# Patient Record
Sex: FEMALE | Race: BLACK OR AFRICAN AMERICAN | Employment: FULL TIME | URBAN - METROPOLITAN AREA
[De-identification: names, ages, dates, MRNs, and addresses within clinical notes are randomized per-mention and may not be internally consistent; named-entity substitution may affect disease eponyms.]

---

## 2020-05-24 ENCOUNTER — HOSPITAL ENCOUNTER (EMERGENCY)
Age: 30
Discharge: HOME | End: 2020-05-24
Attending: EMERGENCY MEDICINE
Payer: OTHER GOVERNMENT

## 2020-05-24 VITALS
WEIGHT: 150 LBS | BODY MASS INDEX: 29.45 KG/M2 | SYSTOLIC BLOOD PRESSURE: 108 MMHG | RESPIRATION RATE: 18 BRPM | HEART RATE: 98 BPM | OXYGEN SATURATION: 100 % | TEMPERATURE: 100 F | DIASTOLIC BLOOD PRESSURE: 76 MMHG | HEIGHT: 60 IN

## 2020-05-24 DIAGNOSIS — J02.0 ACUTE STREPTOCOCCAL PHARYNGITIS: Primary | ICD-10-CM

## 2020-05-24 LAB — DEPRECATED S PYO AG THROAT QL EIA: POSITIVE

## 2020-05-24 PROCEDURE — 74011250637 HC RX REV CODE- 250/637: Performed by: EMERGENCY MEDICINE

## 2020-05-24 PROCEDURE — 87880 STREP A ASSAY W/OPTIC: CPT

## 2020-05-24 PROCEDURE — 99283 EMERGENCY DEPT VISIT LOW MDM: CPT

## 2020-05-24 RX ORDER — ACETAMINOPHEN 500 MG
1000 TABLET ORAL
Status: COMPLETED | OUTPATIENT
Start: 2020-05-24 | End: 2020-05-24

## 2020-05-24 RX ORDER — AMOXICILLIN 500 MG/1
500 TABLET, FILM COATED ORAL 3 TIMES DAILY
Qty: 30 TAB | Refills: 0 | Status: SHIPPED | OUTPATIENT
Start: 2020-05-24 | End: 2020-05-25

## 2020-05-24 RX ADMIN — ACETAMINOPHEN 1000 MG: 500 TABLET, FILM COATED ORAL at 12:03

## 2020-05-24 NOTE — LETTER
NOTIFICATION OF RETURN TO WORK 
 
5/24/2020 12:35 PM 
 
Ms. Karlo Bernard [facility] [id number] Andre[place] Wu To Whom It May Concern: 
 
Karlo Bernard was under the care of [facility] EMERGENCY DEPT. She will be able to return to work on Wednesday, 5/27/20. If there are questions or concerns please have the patient contact our office. Sincerely, Sanjay Galarza PA-C

## 2020-05-24 NOTE — ED PROVIDER NOTES
EMERGENCY DEPARTMENT HISTORY AND PHYSICAL EXAM    12:33 PM      Date: 5/24/2020  Patient Name: Marlon Fleming    History of Presenting Illness     Chief Complaint   Patient presents with    Headache    Sore Throat     History Provided By: Patient    Chief Complaint: headache, sore throat, fever  Duration: 2 Days  Timing:  Acute  Location:   Quality: Aching  Severity: 5 out of 10  Modifying Factors: none  Associated Symptoms: denies any other associated signs or symptoms      Additional History (Context):Leo Steve is a 34 y.o. female who presents to the emergency department for evaluation of sore throat, headache, and fever for the past few days. Patient states she also has a very bad breath. No known ill contacts. No associated nasal congestion or cough. No possibility of pregnancy, urinary symptoms, nausea, vomiting, or any other complaints. PCP:  Estefanía, MD Nathanael            Past History     Past Medical History:  History reviewed. No pertinent past medical history. Past Surgical History:  History reviewed. No pertinent surgical history. Family History:  History reviewed. No pertinent family history. Social History:  Social History     Tobacco Use    Smoking status: Not on file   Substance Use Topics    Alcohol use: Not on file    Drug use: Not on file       Allergies:  No Known Allergies      Review of Systems       Review of Systems   Constitutional: Positive for fever. Negative for chills. HENT: Positive for sore throat. Negative for congestion and rhinorrhea. Respiratory: Negative for cough and shortness of breath. Cardiovascular: Negative for chest pain. Gastrointestinal: Negative for abdominal pain, blood in stool, constipation, diarrhea, nausea and vomiting. Genitourinary: Negative for dysuria, frequency and hematuria. Musculoskeletal: Negative for back pain and myalgias. Skin: Negative for rash and wound. Neurological: Positive for headaches.  Negative for dizziness. All other systems reviewed and are negative. Physical Exam     Visit Vitals  /76   Pulse 98   Temp 100 °F (37.8 °C)   Resp 18   Ht 5' (1.524 m)   Wt 68 kg (150 lb)   SpO2 100%   BMI 29.29 kg/m²       Physical Exam  Vitals signs and nursing note reviewed. Constitutional:       General: She is not in acute distress. Appearance: She is well-developed. She is not diaphoretic. HENT:      Head: Normocephalic and atraumatic. Mouth/Throat:      Mouth: Mucous membranes are moist.      Tonsils: Tonsillar exudate present. No tonsillar abscesses. 2+ on the right. 2+ on the left. Eyes:      Conjunctiva/sclera: Conjunctivae normal.   Neck:      Musculoskeletal: Normal range of motion and neck supple. Cardiovascular:      Rate and Rhythm: Normal rate and regular rhythm. Heart sounds: Normal heart sounds. Pulmonary:      Effort: Pulmonary effort is normal. No respiratory distress. Breath sounds: Normal breath sounds. Chest:      Chest wall: No tenderness. Abdominal:      General: Bowel sounds are normal. There is no distension. Palpations: Abdomen is soft. Tenderness: There is no abdominal tenderness. There is no guarding or rebound. Musculoskeletal:         General: No deformity. Skin:     General: Skin is warm and dry. Neurological:      Mental Status: She is alert and oriented to person, place, and time. Deep Tendon Reflexes: Reflexes are normal and symmetric. Diagnostic Study Results     Labs -  Recent Results (from the past 12 hour(s))   STREP AG SCREEN, GROUP A    Collection Time: 05/24/20 12:05 PM   Result Value Ref Range    Group A Strep Ag ID Positive         Radiologic Studies -   No results found. Medical Decision Making   I am the first provider for this patient. I reviewed the vital signs, available nursing notes, past medical history, past surgical history, family history and social history.     Vital Signs-Reviewed the patient's vital signs. Pulse Oximetry Analysis -  100% on room air (Interpretation)    Records Reviewed: Nursing Notes and Old Medical Records (Time of Review: 12:33 PM)    ED Course: Progress Notes, Reevaluation, and Consults:    Provider Notes (Medical Decision Making):   differential diagnosis: Strep, allergic rhinitis, viral URI    Plan: Patient presents ambulatory with fever and otherwise normal vitals. Exam reveals bilateral tonsillar swelling with exudates. Patient handling oral secretions without difficulty. Negative pregnancy test.  Positive rapid strep. Will discharge home with amoxicillin. Patient advised to follow-up with PCP. At this time, patient is stable and appropriate for discharge home. Patient demonstrates understanding of current diagnoses and is in agreement with the treatment plan. They are advised that while the likelihood of serious underlying condition is low at this point given the evaluation performed today, we cannot fully rule it out. They are advised to immediately return with any new symptoms or worsening of current condition. All questions have been answered. Patient is given educational material regarding their diagnoses, including danger symptoms and when to return to the ED. Diagnosis     Clinical Impression:   1. Acute streptococcal pharyngitis        Disposition: DC Home    Follow-up Information     Follow up With Specialties Details Why Contact Nuvia Leiva  Call in 2 days  [address]    [facility] EMERGENCY DEPT Emergency Medicine Go to As needed, If symptoms worsen [address] [facility] [address].  [phone]           Discharge Medication List as of 5/24/2020 12:35 PM      START taking these medications    Details   amoxicillin 500 mg tab Take 500 mg by mouth three (3) times daily for 10 days. , Print, Disp-30 Tab, R-0           _______________________________    This note was dictated utilizing voice recognition software which may lead to typographical errors. I apologize in advance if the situation occurs. If questions arise please do not hesitate to contact me or call our department.   Savannah Saez PA-C

## 2020-05-24 NOTE — DISCHARGE INSTRUCTIONS
Patient Education     Please return immediately to the Emergency Room for re-evaluation if you are not improving, develop any new symptoms, or develop worsening of current symptoms! If you have been prescribed a medication and are unable to take this medication for any reason, please return to the Emergency Department for further evaluation! If you have been referred for follow-up to a specialist, but are unable to follow-up and your symptoms are either not improving or are worsening, please return to the Emergency Department for further evaluation! Sore Throat: Care Instructions  Your Care Instructions    Infection by bacteria or a virus causes most sore throats. Cigarette smoke, dry air, air pollution, allergies, and yelling can also cause a sore throat. Sore throats can be painful and annoying. Fortunately, most sore throats go away on their own. If you have a bacterial infection, your doctor may prescribe antibiotics. Follow-up care is a key part of your treatment and safety. Be sure to make and go to all appointments, and call your doctor if you are having problems. It's also a good idea to know your test results and keep a list of the medicines you take. How can you care for yourself at home? · If your doctor prescribed antibiotics, take them as directed. Do not stop taking them just because you feel better. You need to take the full course of antibiotics. · Gargle with warm salt water once an hour to help reduce swelling and relieve discomfort. Use 1 teaspoon of salt mixed in 1 cup of warm water. · Take an over-the-counter pain medicine, such as acetaminophen (Tylenol), ibuprofen (Advil, Motrin), or naproxen (Aleve). Read and follow all instructions on the label. · Be careful when taking over-the-counter cold or flu medicines and Tylenol at the same time. Many of these medicines have acetaminophen, which is Tylenol.  Read the labels to make sure that you are not taking more than the recommended dose. Too much acetaminophen (Tylenol) can be harmful. · Drink plenty of fluids. Fluids may help soothe an irritated throat. Hot fluids, such as tea or soup, may help decrease throat pain. · Use over-the-counter throat lozenges to soothe pain. Regular cough drops or hard candy may also help. These should not be given to young children because of the risk of choking. · Do not smoke or allow others to smoke around you. If you need help quitting, talk to your doctor about stop-smoking programs and medicines. These can increase your chances of quitting for good. · Use a vaporizer or humidifier to add moisture to your bedroom. Follow the directions for cleaning the machine. When should you call for help? Call your doctor now or seek immediate medical care if:    · You have new or worse trouble swallowing.     · Your sore throat gets much worse on one side.    Watch closely for changes in your health, and be sure to contact your doctor if you do not get better as expected. Where can you learn more? Go to http://rafia-grant.info/  Enter U420 in the search box to learn more about \"Sore Throat: Care Instructions. \"  Current as of: July 28, 2019Content Version: 12.4  © 6592-0113 [employer]. Care instructions adapted under license by [employer] (which disclaims liability or warranty for this information). If you have questions about a medical condition or this instruction, always ask your healthcare professional. [place] [address] any warranty or liability for your use of this information.

## 2020-05-24 NOTE — ED TRIAGE NOTES
Pt states she has been having fever on and off with associated sore throat since yesterday and states \"my breath smells horrible. Susy tried everything and it still smells like I ate a baby diaper. \"

## 2020-05-25 RX ORDER — AMOXICILLIN 500 MG/1
500 TABLET, FILM COATED ORAL 3 TIMES DAILY
Qty: 30 TAB | Refills: 0 | Status: SHIPPED | OUTPATIENT
Start: 2020-05-25 | End: 2020-06-04

## 2020-05-27 ENCOUNTER — PATIENT OUTREACH (OUTPATIENT)
Dept: CASE MANAGEMENT | Age: 30
End: 2020-05-27

## 2020-05-27 NOTE — PROGRESS NOTES
Patient contacted regarding recent visit for viral symptoms. This Keri Thomasard contacted the patient by telephone to perform post discharge call. Verified name and  with patient as identifiers. Provided introduction to self, and reason for call due to viral symptoms of infection and/or exposure to COVID-19. Patient presented to emergency department/flu clinic with complaints of viral symptoms/exposure to COVID. Patient reports symptoms are improving. Due to no new or worsening symptoms the RN CTN/ACM was not notified for escalation. Discussed exposure protocols and quarantine with CDC Guidelines What To Do If You Are Sick    Patient was given an opportunity for questions and concerns. Stay home except to get medical care  Separate yourself from other people and animals in your home  Call ahead before visiting your doctor  Wear a facemask  Cover your coughs and sneezes  Clean your hands often  Avoid sharing personal household items  Clean all high-touch surfaces everyday    Monitor your symptoms  Seek prompt medical attention if your illness is worsening (e.g., difficulty breathing). Before seeking care, call your healthcare provider and tell them that you have, or are being evaluated for, COVID-19. Put on a facemask before you enter the facility. These steps will help the healthcare provider's office to keep other people in the office or waiting room from getting infected or exposed. Ask your healthcare provider to call the local or [facility] health department. Persons who are placed under If you have a medical emergency and need to call 911, notify the dispatch personnel that you have, or are being evaluated for COVID-19. If possible, put on a facemask before emergency medical services arrive. The patient agrees to contact the [facility] exposure line [phone], local health department [address]  ([phone] and PCP office for questions related to their healthcare.  Author provided contact information for future reference. Patient/family/caregiver given information for [employer] and agrees to enroll no  Patient preferred e-mail: n/a  Patient preferred phone contact: n/a   Based on Loop alert triggers, patient will be contacted by nurse care manager for worsening symptoms.

## 2020-06-10 ENCOUNTER — PATIENT OUTREACH (OUTPATIENT)
Dept: CASE MANAGEMENT | Age: 30
End: 2020-06-10

## 2020-06-10 NOTE — PROGRESS NOTES
Patient contacted regarding COVID-19 risk and screening. This author contacted the patient by telephone to perform follow-up call. Verified name and  with patient as identifiers. Symptoms reviewed with patient. Patient reports symptoms are improving. Due to no new or worsening symptoms the RN CTN/ACM was not notified for escalation. Discussed COVID-19 related testing which was not done at this time. Test results were not done. Patient informed of results, if available? N/A      This author reviewed discharge instructions, medical action plan and red flags such as increased shortness of breath, increasing fever, worsening cough or chest pain with patient who verbalized understanding. Discussed exposure protocols and quarantine with CDC Guidelines What To Do If You Are Sick    Patient who was given an opportunity for questions and concerns. The patient agrees to contact the [facility] exposure line [phone], [facility] department  [facility]  ([phone]) and PCP office for questions related to their healthcare. Author provided contact information for future reference. The patient is resolved from [address] [place] [address] on 6/10/2020. No further follow up calls will be required at this time.